# Patient Record
(demographics unavailable — no encounter records)

---

## 2017-11-01 NOTE — XMS REPORT
[facility]

 Created on: 2016



Cathleen Faith

External Reference #: [id number]

: 1994

Sex: Female



Demographics







 Address  [address] [place]  [address]

 

 Home Phone  [phone]

 

 Preferred Language  Unknown

 

 Marital Status  Unknown

 

 Protestant Affiliation  Unknown

 

 Race  White

 

 Ethnic Group  Not  or 





Author







 JAYCOB Avila

 

 [facility]  eClinical[employer]

 

 Address  Unknown

 

 Phone  Unavailable







Care Team Providers







 Care Team Member Name  Role  Phone

 

 JAYCOB GARCES  CP  Unavailable



                                                                



Allergies, Adverse Reactions, Alerts

          





 Substance  Reaction  Event Type

 

 Penicillin G Potassium  Info Not Available  Drug Allergy



                                                                               
         



Problems

          





 Problem Type  Condition  Code  Onset Dates  Condition Status

 

 Assessment  Dental caries  K02.9     Active



                                                                               
         



Medications

          No Known Medications                                                 
                                       



Procedures

          





 Procedure  Coding System  Code  Date

 

 EXTRAC ERUPTED TOOTH/EXPOSED ROOT  CPT-4    2016



                                                                               
                   



Vital Signs

          





 Date/Time:  2016

 

 Blood Pressure Diastolic  82 mmHg

 

 Blood Pressure Systolic  130 mmHg



                                                                              



Results

          No Known Results                                                     
               



Summary Purpose

          eClinicalWorks Submission

## 2017-11-01 NOTE — XMS REPORT
Continuity of Care Document

 Created on: 2017



JULEE PIMENTEL

External Reference #: [id number]

: 1994

Sex: Female



Demographics







 Address  [address] [place]  [address]

 

 Home Phone  [phone] x

 

 Preferred Language  Unknown

 

 Marital Status  Unknown

 

 Adventism Affiliation  Unknown

 

 Race  Unknown

 

 Ethnic Group  Unknown





Author







 Author  [facility]

 

 Organization  [facility]

 

 Address  Unknown

 

 Phone  Unavailable



                                                      



Allergies

                      





 Active                    Description                    Code                  
  Type                    Severity                    Reaction                  
  Onset                    Reported/Identified                    Relationship 
to Patient                    Clinical Status                

 

 Yes                    Penicillins                    [id number]              
      Drug Allergy                    Unknown                    RASH          
                               2016                                      
                    



                                                                               
         



Medications

                                                                               
         



Problems

                      





 Date Dx Coded                    Attending                    Type            
        Code                    Diagnosis                    Diagnosed By      
          

 

 03/15/2013                                         Ot                    599.0
                    URIN TRACT INFECTION NOS                                   
  

 

 03/15/2013                                         Ot                    
644.03                    THRT DAJUAN LABOR-ANTEPART                             
        

 

 03/15/2013                                         Ot                    
646.63                     INFECTION-ANTEPARTUM                              
       

 

 2013                                         Ot                    
646.13                    EDEMA IN PREG-ANTEPARTUM                             
        

 

 2013                                         Ot                    599.0
                    URIN TRACT INFECTION NOS                                   
  

 

 2013                                         Ot                    
644.03                    THRT DAJUAN LABOR-ANTEPART                             
        

 

 2013                                         Ot                    
646.63                     INFECTION-ANTEPARTUM                              
       

 

 2013                                         Ot                    
663.31                    CORD ENTANGLE NEC-DELIV                              
       

 

 2013                                         Ot                    V27.0
                    DELIVER-SINGLE LIVEBORN                                     

 

 2013                    KAYA RACHEL                    Ot     
               599.0                    URIN TRACT INFECTION NOS               
                      

 

 2013                    KAYA RACHEL                    Ot     
               780.2                    SYNCOPE AND COLLAPSE                   
                  

 

 2013                    KAYA RACHEL                    Ot     
               787.03                    VOMITING ALONE                        
             

 

 2013                    MEE WEBER DO                    Ot           
         300.00                    ANXIETY STATE NOS                           
          

 

 2013                    CHRISTIANO WEBER DOA DANIEL                    Ot           
         311                    DEPRESSIVE DISORDER NEC                        
             

 

 2013                    MEE WEBER DO                    Ot           
         599.0                    URIN TRACT INFECTION NOS                     
                

 

 2013                    MEE WEBER DO                    Ot           
         714.0                    RHEUMATOID ARTHRITIS                         
            

 

 2013                    MEE WEBER DO                    Ot           
         789.00                    ABDOMINAL PAIN, UNSPECIFIED SITE            
                         

 

 06/15/2014                    KAYA RACHEL                    Ot     
               729.5                    PAIN IN LIMB                           
          

 

 06/15/2014                    KAYA RACHEL                    Ot     
               845.00                    SPRAIN OF ANKLE NOS                   
                  

 

 06/15/2014                    KAYA RACHEL                    Ot     
               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 
MOVEM                                     

 

 2014                    TYE BHAGAT MD                    Ot       
             599.0                    URIN TRACT INFECTION NOS                 
                    

 

 2014                                         Ot                    
649.63                                                          

 

 2014                                         Ot                    
640.93                                                          

 

 2014                                         Ot                    
649.63                                                          

 

 2014                                         Ot                    
V28.89                                                          

 

 2014                    CARMEN MCGOVERN MD                    Ot        
            629.89                                                          

 

 2014                    CARMEN MCGOVERN MD                    Ot        
            789.01                                                          

 

 2014                    SRIDEVI HERNANDEZ, MELISSA LADD                    Ot    
                599.0                    URIN TRACT INFECTION NOS              
                       

 

 2014                    SRIDEVI HERNANDEZ, MELISSA LADD                    Ot    
                787.01                    NAUSEA WITH VOMITING                 
                    

 

 2014                    SRIDEVI HERNANDEZ, MELISSA LADD                    Ot    
                787.03                    VOMITING ALONE                       
              

 

 2014                    SRIDEVI HERNANDEZ, MELISSA LADD                    Ot    
                787.91                    DIARRHEA                             
        

 

 2015                                         Ot                    
649.63                                                          

 

 2015                                         Ot                    
640.93                                                          

 

 2015                                         Ot                    
649.63                                                          

 

 2015                                         Ot                    
V28.89                                                          

 

 2015                    CARMEN MCGOVERN MD                    Ot        
            629.89                                                          

 

 2015                    CARMEN MCGOVERN MD                    Ot        
            789.01                                                          

 

 2015                    MEE WEBER DO                    Ot           
         911.4                    INSECT BITE TRUNK                            
         

 

 2015                    MEE WEBER DO                    Ot           
         E000.8                    OTHER EXTERNAL CAUSE STATUS                 
                    

 

 2015                    MEE WEBER DO                    Ot           
         E906.4                    NONVENOM ARTHROPOD BITE                     
                

 

 2015                                         Ot                    
649.63                                                          

 

 2015                                         Ot                    
640.93                                                          

 

 2015                                         Ot                    
649.63                                                          

 

 2015                                         Ot                    
V28.89                                                          

 

 2015                    CARMEN MCGOVERN MD                    Ot        
            629.89                                                          

 

 2015                    CARMEN MCGOVERN MD                    Ot        
            789.01                                                          

 

 2015                                         Ot                    
649.63                                                          

 

 2015                                         Ot                    
640.93                                                          

 

 2015                                         Ot                    
649.63                                                          

 

 2015                                         Ot                    
V28.89                                                          

 

 2015                    CARMEN MCGOVERN MD                    Ot        
            629.89                                                          

 

 2015                    CARMEN MCGOVERN MD                    Ot        
            789.01                                                          

 

 2015                    TYE BHAGAT MD                    Ot       
             786.50                    CHEST PAIN NOS                          
           

 

 2015                    LEOLA HERNANDEZ, TYE SANCHEZ                    Ot       
             786.52                    PAINFUL RESPIRATION                     
                

 

 2015                    MEE WEBER DO                    Ot           
         381.10                    CHR SEROUS OM SIMP/NOS                      
               

 

 2015                    MEE WEBER DO                    Ot           
         464.00                    ACUTE LARYNGITIS W/O OBSTRUCTION            
                         

 

 2015                    MEE WEBER DO                    Ot           
         466.0                    ACUTE BRONCHITIS                             
        

 

 2015                    MEE WEBER DO                    Ot           
         473.9                    CHRONIC SINUSITIS NOS                        
             

 

 2015                    MEE WEBER DO                    Ot           
         786.2                    COUGH                                     

 

 2015                                         Ot                    
649.63                                                          

 

 2015                                         Ot                    
640.93                                                          

 

 2015                                         Ot                    
649.63                                                          

 

 2015                                         Ot                    
V28.89                                                          

 

 2015                    CARMEN MCGOVERN MD                    Ot        
            629.89                                                          

 

 2015                    CARMEN MCGOVERN MD                    Ot        
            789.01                                                          

 

 2016                                         Ot                    
649.63                                                          

 

 2016                                         Ot                    
640.93                                                          

 

 2016                                         Ot                    
649.63                                                          

 

 2016                                         Ot                    
V28.89                                                          

 

 2016                    CARMEN MCGOVERN MD                    Ot        
            629.89                                                          

 

 2016                    CARMEN MCGOVERN MD                    Ot        
            789.01                                                          

 

 2016                    CARMEN MCGOVERN MD                    Ot        
            B96.20                    UNSP ESCHERICHIA COLI AS THE CAUSE OF DI 
                                    

 

 2016                    CARMEN MCGOVERN MD                    Ot        
            I88.0                    NONSPECIFIC MESENTERIC LYMPHADENITIS      
                               

 

 2016                    CARMEN MCGOVERN MD                    Ot        
            K85.9                    ACUTE PANCREATITIS, UNSPECIFIED           
                          

 

 2016                    CARMEN MCGOVERN MD                    Ot        
            N26.1                    ATROPHY OF KIDNEY (TERMINAL)              
                       

 

 2016                    CARMEN MCGOVERN MD                    Ot        
            N39.0                    URINARY TRACT INFECTION, SITE NOT SPECIF  
                                   

 

 2016                    SALLY RAE                    Ot       
             S60.221A                    CONTUSION OF RIGHT HAND, INITIAL 
ENCOUNT                                     

 

 2016                    SALLY RAE                    Ot       
             W22.09XA                    STRIKING AGAINST OTHER STATIONARY 
OBJECT                                     

 

 2016                    SALLY RAE                    Ot       
             Y99.8                    OTHER EXTERNAL CAUSE STATUS              
                       

 

 2016                    CARMEN MCGOVERN MD                    Ot        
            K85.9                    ACUTE PANCREATITIS, UNSPECIFIED           
                          

 

 2016                    CARMEN MCGOVERN MD, Ot        
            K85.9                    ACUTE PANCREATITIS, UNSPECIFIED           
                          

 

 2016                    YAZ ALMARAZ, KAYA L                    Ot     
               R10.12                    LEFT UPPER QUADRANT PAIN              
                       

 

 2016                    KAYA RACHEL                    Ot     
               R11.0                    NAUSEA                                 
    

 

 2016                    KAYA RACHEL                    Ot     
               R10.12                    LEFT UPPER QUADRANT PAIN              
                       

 

 2016                    YAZ ALMARAZ KAYA L                    Ot     
               R11.0                    NAUSEA                                 
    

 

 2016                    SALLY RAE                    Ot       
             R10.13                    EPIGASTRIC PAIN                         
            

 

 2016                    SALLY RAE                    Ot       
             Z87.19                    PERSONAL HISTORY OF OTHER DISEASES OF TH
                                     

 

 2016                                         Ot                    
649.63                    UTERINE SIZE DATE DISCREPANCY, ANTEPARTU             
                        

 

 2016                                         Ot                    
640.93                    HEM EARLY PREG-ANTEPART                              
       

 

 2016                                         Ot                    
649.63                    UTERINE SIZE DATE DISCREPANCY, ANTEPARTU             
                        

 

 2016                                         Ot                    
V28.89                    OTHER SPECIFIED  SCREENING                  
                   

 

 2016                    CARMEN MCGOVERN MD                    Ot        
            629.89                    OTHER SPECIFIED DISORDERS OF FEMALE PEPE 
                                    

 

 2016                    CARMEN MCGOVERN MD                    Ot        
            789.01                    ABDOMINAL PAIN, RIGHT UPPER QUADRANT     
                                

 

 2016                    SALLY RAE                    Ot       
             R10.13                    EPIGASTRIC PAIN                         
            

 

 2016                    SALLY RAE                    Ot       
             Z87.19                    PERSONAL HISTORY OF OTHER DISEASES OF 
                                     

 

 2016                                         Ot                    
649.63                    UTERINE SIZE DATE DISCREPANCY, ANTEPARTU             
                        

 

 2016                                         Ot                    
640.93                    HEM EARLY PREG-ANTEPART                              
       

 

 2016                                         Ot                    
649.63                    UTERINE SIZE DATE DISCREPANCY, ANTEPARTU             
                        

 

 2016                                         Ot                    
V28.89                    OTHER SPECIFIED  SCREENING                  
                   

 

 2016                    FROILAN HERNANDEZ, CARMEN YU                    Ot        
            629.89                    OTHER SPECIFIED DISORDERS OF FEMALE PEPE 
                                    

 

 2016                    CARMEN MCGOVERN MD                    Ot        
            789.01                    ABDOMINAL PAIN, RIGHT UPPER QUADRANT     
                                

 

 2016                    SALLY RAE                    Ot       
             R10.13                    EPIGASTRIC PAIN                         
            

 

 2016                    SALLY RAE                    Ot       
             Z87.19                    PERSONAL HISTORY OF OTHER DISEASES OF 
                                     

 

 2016                                         Ot                    
649.63                    UTERINE SIZE DATE DISCREPANCY, ANTEPARTU             
                        

 

 2016                                         Ot                    
640.93                    HEM EARLY PREG-ANTEPART                              
       

 

 2016                                         Ot                    
649.63                    UTERINE SIZE DATE DISCREPANCY, ANTEPARTU             
                        

 

 2016                                         Ot                    
V28.89                    OTHER SPECIFIED  SCREENING                  
                   

 

 2016                    CARMEN MCGOVERN MD                    Ot        
            629.89                    OTHER SPECIFIED DISORDERS OF FEMALE PEPE 
                                    

 

 2016                    CARMEN MCGOVERN MD                    Ot        
            789.01                    ABDOMINAL PAIN, RIGHT UPPER QUADRANT     
                                

 

 2016                    SALLY RAE APRN                    Ot       
             R10.13                    EPIGASTRIC PAIN                         
            

 

 2016                    SALLY RAE APRN                    Ot       
             Z87.19                    PERSONAL HISTORY OF OTHER DISEASES OF TH
                                     

 

 2016                    CARMEN MCGOVERN MD                    Ot        
            K85.9                    ACUTE PANCREATITIS, UNSPECIFIED           
                          

 

 2016                    CARMEN MCGOVERN MD                    Ot        
            K85.9                    ACUTE PANCREATITIS, UNSPECIFIED           
                          

 

 2016                                         Ot                    
649.63                    UTERINE SIZE DATE DISCREPANCY, ANTEPARTU             
                        

 

 2016                                         Ot                    
640.93                    HEM EARLY PREG-ANTEPART                              
       

 

 2016                                         Ot                    
649.63                    UTERINE SIZE DATE DISCREPANCY, ANTEPARTU             
                        

 

 2016                                         Ot                    
V28.89                    OTHER SPECIFIED  SCREENING                  
                   

 

 2016                    CARMEN MCGOVERN MD                    Ot        
            629.89                    OTHER SPECIFIED DISORDERS OF FEMALE PEPE 
                                    

 

 2016                    CARMEN MCGOVERN MD                    Ot        
            789.01                    ABDOMINAL PAIN, RIGHT UPPER QUADRANT     
                                

 

 2016                    CARMEN MCGOVERN MD                    Ot        
            K85.9                    ACUTE PANCREATITIS, UNSPECIFIED           
                          

 

 2016                    SALLY RAE APRN                    Ot       
             R10.13                    EPIGASTRIC PAIN                         
            

 

 2016                    SALLY RAE                    Ot       
             Z87.19                    PERSONAL HISTORY OF OTHER DISEASES OF TH
                                     

 

 2016                    CARMEN MCGOVERN MD                    Ot        
            K81.1                    CHRONIC CHOLECYSTITIS                     
                

 

 2016                    CARMEN MCGOVERN MD                    Ot        
            K82.8                    OTHER SPECIFIED DISEASES OF GALLBLADDER   
                                  

 

 2016                    CARMEN MCGOVERN MD                    Ot        
            K85.9                    ACUTE PANCREATITIS, UNSPECIFIED           
                          

 

 2016                    CARMEN MCGOVERN MD                    Ot        
            K81.1                    CHRONIC CHOLECYSTITIS                     
                

 

 2016                    CRAMEN MCGOVERN MD                    Ot        
            K82.8                    OTHER SPECIFIED DISEASES OF GALLBLADDER   
                                  

 

 2016                                         Ot                    
649.63                    UTERINE SIZE DATE DISCREPANCY, ANTEPARTU             
                        

 

 2016                                         Ot                    
640.93                    HEM EARLY PREG-ANTEPART                              
       

 

 2016                                         Ot                    
649.63                    UTERINE SIZE DATE DISCREPANCY, ANTEPARTU             
                        

 

 2016                                         Ot                    
V28.89                    OTHER SPECIFIED  SCREENING                  
                   

 

 2016                    CARMEN MCGOVERN MD                    Ot        
            629.89                    OTHER SPECIFIED DISORDERS OF FEMALE PEPE 
                                    

 

 2016                    CARMEN MCGOVERN MD                    Ot        
            789.01                    ABDOMINAL PAIN, RIGHT UPPER QUADRANT     
                                

 

 2016                    CARMEN MCGOVERN MD                    Ot        
            K85.9                    ACUTE PANCREATITIS, UNSPECIFIED           
                          

 

 2016                    CARMEN MCGOVERN MD                    [place]        
            K85.9                    ACUTE PANCREATITIS, UNSPECIFIED           
                          

 

 2016                    SALLY RAE APRN                    Ot       
             R10.13                    EPIGASTRIC PAIN                         
            

 

 2016                    SALLY RAE                    Ot       
             Z87.19                    PERSONAL HISTORY OF OTHER DISEASES OF TH
                                     

 

 2016                    CARMEN MCGOVERN MD                    Ot        
            K81.1                    CHRONIC CHOLECYSTITIS                     
                

 

 2016                    CARMEN MCGOVERN MD                    Ot        
            K82.8                    OTHER SPECIFIED DISEASES OF GALLBLADDER   
                                  

 

 2016                    TYE BHAGAT MD                    Ot       
             H60.501                    UNSPECIFIED ACUTE NONINFECTIVE OTITIS 
EX                                     

 

 2016                    TYE BHAGAT MD                    Ot       
             J06.9                    ACUTE UPPER RESPIRATORY INFECTION, UNSPE 
                                    

 

 2016                    TYE BHAGAT MD                    Ot       
             R05                    COUGH                                     

 

 2016                    TYE BHAGAT MD                    Ot       
             H60.501                    UNSPECIFIED ACUTE NONINFECTIVE OTITIS 
EX                                     

 

 2016                    TYE BHAGAT MD                    Ot       
             J06.9                    ACUTE UPPER RESPIRATORY INFECTION, UNSPE 
                                    

 

 2016                    TYE BHAGAT MD                    Ot       
             R05                    COUGH                                     



                                                                               
                                                                               
                                                                               
                                                                               
                                                                               
                                                                               
                                                                               
                                                                               
                                                                               
                                                                               
                                                                               
                                                                               
                                                                               
                                                                               
                                                                               
                                                                               
                                                                               
                                                                               
                                                                               
                           



Procedures

                      





 Code                    Description                    Performed By           
         Performed On                

 

                     73.6                                                      
                                                               2013      
          



                                                                               
         



Results

                                                                    



Encounters

                      





 ACCT No.                    Visit Date/Time                    Discharge      
              Status                    Pt. Type                    Provider   
                 Facility                    Loc./Unit                    
Complaint                

 

 [id number]                    2016 08:28:00                    2016 09:00:00                    DIS                    Outpatient             
       TYE BHAGAT MD                    [facility] 
                   ER                    R EAR PAIN, SINUS INFECTION           
     

 

 [id number]                    2016 00:30:00                    2016 15:35:00                    DIS                    Outpatient             
       CARMEN MCGOVERN MD                    [facility]                                     

 

 [id number]                    2016 11:18:00                    2016 23:59:59                    CLS                    Outpatient             
       CARMEN MCGOVERN MD                    [facility]  
                  CARD                                     

 

 [id number]                    2016 19:06:00                    2016 20:36:00                    DIS                    Outpatient             
       SALLY RAE APRN                    [facility] 
                   ER                                     

 

 [id number]                    2016 19:00:00                    2016 23:00:00                    DIS                    Emergency              
      YAZ ALMARAZKAYA                    [facility]                                     

 

 [id number]                    2016 09:51:00                    2016 12:15:00                    DIS                    Inpatient              
      CARMEN MCGOVERN MD                    [facility] [address]                                     

 

 [id number]                    2016 09:55:00                    2016 11:22:00                    DIS                    Emergency              
      SALLY RAE                    [facility]                                     

 

 [id number]                    2016 14:26:00                    2016 09:00:00                    DIS                    Inpatient              
      CARMEN MCGOVERN MD                    [facility] [address]                                     

 

 [id number]                    2015 20:59:00                    2015 22:27:00                    DIS                    Emergency              
      TIA DO MEE K                    [facility]                                     

 

 [id number]                    2015 09:22:00                    2015 11:41:00                    DIS                    Emergency              
      TYE BHAGAT MD                    [facility]  
                  ER                                     

 

 [id number]                    2015 09:19:00                    2015 09:34:00                    DIS                    Emergency              
      TIA CHRISTIANO BURRA K                    [facility]                                     

 

 [id number]                    2014 08:46:00                    2014 10:17:00                    DIS                    Emergency              
      MELISSA LAUREANO MD                    [facility]                    ER                                     

 

 [id number]                    2014 16:41:00                    2014 18:52:00                    DIS                    Emergency              
      TYE BHAGAT MD                    [facility]  
                  ER                                     

 

 [id number]                    2014 10:39:00                    2014 23:59:59                    CLS                    Outpatient             
       CARMEN MCGOVERN MD                    [facility]                                     

 

 [id number]                    2014 13:43:00                    2014 23:59:59                    CLS                    Outpatient             
       CARMEN MCGOVERN MD                    [facility]                                     

 

 [id number]                    06/15/2014 13:15:00                    06/15/
2014 15:10:00                    DIS                    Emergency              
      KAYA RACHEL                    [facility]
                    ER                                     

 

 [id number]                    2013 12:27:00                    2013 14:09:00                    DIS                    Emergency              
      TIA BURR MEE DANIEL                    [facility]      
              ER                                     

 

 [id number]                    2013 20:40:00                    2013 23:15:00                    DIS                    Emergency              
      KAYA RACHEL                    [facility]
                    ER                                     

 

 [id number]                    2013 02:48:00                           
                                   Document Registration                       
                                                                             

 

 [id number]                    2013 22:05:00                           
                                   Document Registration                       
                                                                             

 

 [id number]                    2013 00:36:00                           
                                   Document Registration                       
                                                                             

 

 [id number]                    2013 16:00:00                           
                                   Document Registration                       
                                                                             

 

 [id number]                    2013 09:37:00                           
                                   Document Registration                       
                                                                             

 

 [id number]                    2012 10:39:00                           
                                   Document Registration                       
                                                                             

 

 [id number]                    2012 09:40:00                           
                                   Document Registration                       
                                                                             

 

 [id number]                    10/10/2012 09:16:00                           
                                   Document Registration

## 2017-11-01 NOTE — ED GENERAL
General


Chief Complaint:  Abdominal/GI Problems


Stated Complaint:  N,V,D


Nursing Triage Note:  


AMB TO ROOM REPORTS NOT FEELING WELL FOR 2 DAYS. ONSET OF VOMITING WITH   


DIARRHEA.


Nursing Sepsis Screen:  No Definite Risk


Source of Information:  Patient


Exam Limitations:  No Limitations





History of Present Illness


Time Seen by Provider:  08:26


Initial Comments


This 23-year-old woman presents to the emergency room with complaints of 

generally feeling ill for about 2 days.  She began vomiting and having diarrhea 

last night.  The vomiting has stopped but she is still nauseated.  She denies 

pregnancy.  She has mild generalized abdominal discomfort with cramping.





Allergies and Home Medications


Allergies


Coded Allergies:  


     Penicillins (Verified  Allergy, Unknown, RASH, 3/5/16)





Home Medications


Ondansetron 4 Mg Tab.rapdis, 4 MG SL Q4H PRN for NAUSEA/VOMITING-1ST LINE, #10


   Prescribed by: MILLA BUSBY on 11/1/17 0949





Constitutional:  see HPI, No fever


EENTM:  no symptoms reported


Respiratory:  no symptoms reported


Cardiovascular:  no symptoms reported


Gastrointestinal:  see HPI


Pregnant:  No


Musculoskeletal:  no symptoms reported


Skin:  no symptoms reported


Psychiatric/Neurological:  No Symptoms Reported


Hematologic/Lymphatic:  No Symptoms Reported





Past Medical-Social-Family Hx


Patient Social History


Alcohol Use:  Denies Use


Recreational Drug Use:  No


Smoking Status:  Former Smoker


Recent Foreign Travel:  No


Contact w/Someone Who Travel:  No


Recent Infectious Disease Expo:  No


Recent Hopitalizations:  No





Immunizations Up To Date


Tetanus Booster (TDap):  Less than 5yrs


Date of Influenza Vaccine:  Jan 14, 2013





Seasonal Allergies


Seasonal Allergies:  No





Surgeries


History of Surgeries:  Yes


Surgeries:  Gallbladder





Respiratory


History of Respiratory Disorde:  No


Currently Using CPAP:  No


Currently Using BIPAP:  No





Cardiovascular


History of Cardiac Disorders:  No





Neurological


History of Neurological Disord:  No





Reproductive System


Pregnant:  No


Hx Reproductive Disorders:  No


Sexually Transmitted Disease:  No


HIV/AIDS:  No


Female Reproductive Disorders:  Denies





Genitourinary


History of Genitourinary Disor:  Yes


Genitourinary Disorders:  UTI-Chronic





Gastrointestinal


History of Gastrointestinal Di:  Yes


Gastrointestinal Disorders:  Pancreatitis, Gall Bladder Disease





Musculoskeletal


History of Musculoskeletal Dis:  Yes


Musculoskeletal Disorders:  Rheumatoid Arthritis, Fractures





Endocrine


History of Endocrine Disorders:  No





HEENT


Loss of Vision:  Denies


Hearing Impairment:  Denies





Cancer


History of Cancer:  No





Psychosocial


History of Psychiatric Problem:  Yes


Behavioral Health Disorders:  Anxiety, Depression





Integumentary


History of Skin or Integumenta:  No


Skin/Integumentary Disorders:  Eczema





Blood Transfusions


History of Blood Disorders:  No


Adverse Reaction to a Blood Tr:  No





Family Medical History


Significant Family History:  No Pertinent Family Hx


Family Medial History:  


DENIES FAMILY HX





Physical Exam


Vital Signs





Vital Sign - Last 12Hours








 11/1/17 11/1/17





 08:17 09:55


 


Temp 98.5 


 


Pulse  80


 


Resp  18


 


Pulse Ox  98





Capillary Refill : Less Than 3 Seconds


General Appearance:  No Apparent Distress, WD/WN


HEENT:  Normal ENT Inspection, Pharynx Normal


Neck:  Normal Inspection


Respiratory:  Lungs Clear, Normal Breath Sounds, No Accessory Muscle Use, No 

Respiratory Distress


Cardiovascular:  Regular Rate, Rhythm, No Edema, No Murmur


Gastrointestinal:  Normal Bowel Sounds, Soft, Tenderness (Minimal, generalized)


Extremity:  Normal Inspection, No Pedal Edema


Neurologic/Psychiatric:  Alert, Oriented x3, No Motor/Sensory Deficits, Normal 

Mood/Affect


Skin:  Normal Color, Warm/Dry





Progress/Results/Core Measures


Results/Orders


My Orders





Medications Given in ED





Vital Signs/I&O





Progress Note :  


Progress Note


Nausea resolved with Zofran and abdominal pain resolved with GI cocktail.





Departure


Impression


Impression:  


 Primary Impression:  


 Nausea vomiting and diarrhea


 Additional Impressions:  


 Upper abdominal pain


 Gastritis


 Qualified Codes:  K29.00 - Acute gastritis without bleeding


Disposition:  01 HOME, SELF-CARE


Condition:  Improved





Departure-Patient Inst.


Decision time for Depature:  09:35


Referrals:  


CARMEN MCGOVERN MD (PCP/Family)


Primary Care Physician


Patient Instructions:  Acute Abdomen (Belly Pain), Adult (DC)





Add. Discharge Instructions:  


Drink plenty of clear liquids.  Take an antacid such as Pepcid (famotidine) or 

Prilosec (omeprazole) purchased over-the-counter for abdominal pain.  You may 

also take Tylenol (acetaminophen) up to 1000 mg every 6 hours as needed for 

additional pain relief.  Gradually advance your diet with small quantities of 

bland food as tolerated.  Take Zofran (ondansetron) dissolved under the tongue 

every 4 hours as needed for nausea and vomiting.  You may use Imodium over-the-

counter for diarrhea.  Return to care if symptoms worsen.











All discharge instructions reviewed with patient and/or family. Voiced 

understanding.


Scripts


Ondansetron (Zofran Odt) 4 Mg Tab.rapdis


4 MG SL Q4H Y for NAUSEA/VOMITING-1ST LINE, #10 TAB


   Prov: MILLA WILLIAM MD         11/1/17











MILLA WILLIAM MD Nov 1, 2017 09:38

## 2017-12-20 NOTE — ED INTEGUMENTARY GENERAL
General


Chief Complaint:  Skin/Wound Problems


Stated Complaint:  RASH


Nursing Triage Note:  


pt c/o red, itchy, raised areas on skin since saturday.  she reports they come 


and go.  reports benadryl improves the itchiness, but does not resolve the rash.


Source:  patient


Exam Limitations:  no limitations





History of Present Illness


Time seen by provider:  22:52


Initial Comments


To ER with red itchy bumps for the past 3-4 days.  They come and go.  Benadryl 

helps the itchiness but the bumps still pop up.  Mostly on the back of her arms 

and legs but she also has some on her torso.  No known cause.


Timing/Duration:  just prior to arrival


Severity:  moderate





Allergies and Home Medications


Allergies


Coded Allergies:  


     Penicillins (Verified  Allergy, Unknown, RASH, 3/5/16)





Home Medications


Diphenhydramine HCl 25 Mg Capsule, 50 MG PO PRN, (Reported)





Constitutional:  see HPI


EENTM:  see HPI


Respiratory:  no symptoms reported


Cardiovascular:  no symptoms reported


Genitourinary:  no symptoms reported


Musculoskeletal:  no symptoms reported


Skin:  see HPI


Psychiatric/Neurological:  No Symptoms Reported


Endocrine:  No Symptoms Reported





Past Medical-Social-Family Hx


Patient Social History


Alcohol Use:  Denies Use


Recreational Drug Use:  No


2nd Hand Smoke Exposure:  No


Recent Foreign Travel:  No


Contact w/Someone Who Travel:  No


Recent Infectious Disease Expo:  No


Recent Hopitalizations:  No





Immunizations Up To Date


Tetanus Booster (TDap):  Less than 5yrs


Date of Influenza Vaccine:  Jan 14, 2013





Seasonal Allergies


Seasonal Allergies:  No





Surgeries


History of Surgeries:  Yes


Surgeries:  Gallbladder





Respiratory


History of Respiratory Disorde:  No


Currently Using CPAP:  No


Currently Using BIPAP:  No





Cardiovascular


History of Cardiac Disorders:  No





Neurological


History of Neurological Disord:  No





Reproductive System


Last Menstrual Period:  Nov 30, 2017


Hx Reproductive Disorders:  No


Sexually Transmitted Disease:  No


HIV/AIDS:  No


Female Reproductive Disorders:  Denies





Genitourinary


History of Genitourinary Disor:  Yes


Genitourinary Disorders:  UTI-Chronic





Gastrointestinal


History of Gastrointestinal Di:  Yes


Gastrointestinal Disorders:  Pancreatitis, Gall Bladder Disease





Musculoskeletal


History of Musculoskeletal Dis:  Yes


Musculoskeletal Disorders:  Rheumatoid Arthritis, Fractures





Endocrine


History of Endocrine Disorders:  No





HEENT


Loss of Vision:  Denies


Hearing Impairment:  Denies





Cancer


History of Cancer:  No





Psychosocial


History of Psychiatric Problem:  Yes


Behavioral Health Disorders:  Anxiety, Depression





Integumentary


History of Skin or Integumenta:  No


Skin/Integumentary Disorders:  Eczema





Blood Transfusions


History of Blood Disorders:  No


Adverse Reaction to a Blood Tr:  No





Family Medical History


Significant Family History:  No Pertinent Family Hx


Family Medial History:  


DENIES FAMILY HX





Physical Exam


Vital Signs





Vital Sign - Last 12Hours








 12/20/17





 22:41


 


Temp 98.8


 


Pulse 100


 


Resp 16


 


B/P (MAP) 126/86 (99)





Capillary Refill : Less Than 3 Seconds


General Appearance:  WD/WN, no apparent distress


HEENT:  PERRL/EOMI, normal ENT inspection


Neck:  non-tender, full range of motion


Respiratory:  no respiratory distress, no accessory muscle use


Neurologic/Psychiatric:  alert, normal mood/affect, oriented x 3


Skin:  normal color, warm/dry (erythematous papules on erythematous base on the 

dorsal aspect of her arms a linear pattern consistent with bedbug bites.  I 

suggested that this might be bedbugs but she states this is not likely.)





Progress/Results/Core Measures


Results/Orders


Vital Signs/I&O





Vital Sign - Last 12Hours








 12/20/17





 22:41


 


Temp 98.8


 


Pulse 100


 


Resp 16


 


B/P (MAP) 126/86 (99)














Blood Pressure Mean:  99











Departure


Impression


Impression:  


 Primary Impression:  


 Rash and nonspecific skin eruption


Disposition:  01 HOME, SELF-CARE


Condition:  Stable





Departure-Patient Inst.


Decision time for Depature:  22:53


Referrals:  


CARMEN MCGOVERN MD (PCP/Family)


Primary Care Physician


Patient Instructions:  Skin Rash





Add. Discharge Instructions:  


1.  Return to ER for any worsening.  If rash persists follow up with your 

doctor next week.  Take the steroids as directed.


2.  All discharge instructions reviewed with patient and/or family. Voiced 

understanding.


Scripts


Prednisone (Prednisone) 20 Mg Tab


40 MG PO DAILY, #6 TAB


   Prov: SALLY RAE         12/20/17











SALLY RAE Dec 20, 2017 22:54

## 2017-12-21 NOTE — XMS REPORT
Continuity of Care Document

 Created on: 2017



OLGA PIMENTEL

External Reference #: [id number]

: 1994

Sex: Female



Demographics







 Address  [address] [place]  [address]

 

 Home Phone  [phone] x

 

 Preferred Language  Unknown

 

 Marital Status  Unknown

 

 Gnosticist Affiliation  Unknown

 

 Race  Unknown

 

 Ethnic Group  Unknown





Author







 Author  [facility]

 

 Organization  [facility]

 

 Address  Unknown

 

 Phone  Unavailable



              



Allergies

      





 Active            Description            Code            Type            
Severity            Reaction            Onset            Reported/Identified   
         Relationship to Patient            Clinical Status        

 

 Yes            Penicillins            [id number]            Drug Allergy      
      Unknown            RASH                         2016               
                   



                  



Medications

      



There is no data.                  



Problems

      





 Date Dx Coded            Attending            Type            Code            
Diagnosis            Diagnosed By        

 

 03/15/2013                         Ot            599.0            URIN TRACT 
INFECTION NOS                     

 

 03/15/2013                         Ot            644.03            THRT DAJUAN 
LABOR-ANTEPART                     

 

 03/15/2013                         Ot            646.63             INFECTION
-ANTEPARTUM                     

 

 2013                         Ot            646.13            EDEMA IN 
PREG-ANTEPARTUM                     

 

 2013                         Ot            599.0            URIN TRACT 
INFECTION NOS                     

 

 2013                         Ot            644.03            THRT DAJUAN 
LABOR-ANTEPART                     

 

 2013                         Ot            646.63             INFECTION
-ANTEPARTUM                     

 

 2013                         Ot            663.31            CORD 
ENTANGLE NEC-DELIV                     

 

 2013                         Ot            V27.0            DELIVER-
SINGLE LIVEBORN                     

 

 2013            KAYA RACHEL            Ot            599.0    
        URIN TRACT INFECTION NOS                     

 

 2013            KAYA RACHEL            Ot            780.2    
        SYNCOPE AND COLLAPSE                     

 

 2013            KAYA RACHEL            Ot            787.03   
         VOMITING ALONE                     

 

 2013            MEE WEBER DO            Ot            300.00         
   ANXIETY STATE NOS                     

 

 2013            MEE WEBER DO            Ot            311            
DEPRESSIVE DISORDER NEC                     

 

 2013            MEE WEBER DO            Ot            599.0          
  URIN TRACT INFECTION NOS                     

 

 2013            MEE WEBER DO            Ot            714.0          
  RHEUMATOID ARTHRITIS                     

 

 2013            MEE WEBER DO            Ot            789.00         
   ABDOMINAL PAIN, UNSPECIFIED SITE                     

 

 06/15/2014            KAYA RACHEL            Ot            729.5    
        PAIN IN LIMB                     

 

 06/15/2014            KAYA RACHEL            Ot            845.00   
         SPRAIN OF ANKLE NOS                     

 

 06/15/2014            YAZ PA, KAYA L            Ot            E927.0   
         OVEREXERTION FROM SUDDEN STRENUOUS MOVEM                     

 

 2014            TYE BHAGAT MD            Ot            599.0      
      URIN TRACT INFECTION NOS                     

 

 2014                         Ot            649.63                       
           

 

 2014                         Ot            640.93                       
           

 

 2014                         Ot            649.63                       
           

 

 2014                         Ot            V28.89                       
           

 

 2014            CARMEN MCGOVERN MD            Ot            629.89      
                            

 

 2014            CARMEN MCGOVERN MD            Ot            789.01      
                            

 

 2014            SRIDEVI HERNANDEZ, MELISSA LADD            Ot            599.0   
         URIN TRACT INFECTION NOS                     

 

 2014            SRIDEVI HERNANDEZ, MELISSA LADD            Ot            787.01  
          NAUSEA WITH VOMITING                     

 

 2014            MELISSA LAUREANO MD            Ot            787.03  
          VOMITING ALONE                     

 

 2014            MELISSA LAUREANO MD            Ot            787.91  
          DIARRHEA                     

 

 2015                         Ot            649.63                       
           

 

 2015                         Ot            640.93                       
           

 

 2015                         Ot            649.63                       
           

 

 2015                         Ot            V28.89                       
           

 

 2015            CARMEN MCGOVERN MD            Ot            629.89      
                            

 

 2015            CARMEN MCGOVERN MD            Ot            789.01      
                            

 

 2015            MEE WEBRE DO            Ot            911.4          
  INSECT BITE TRUNK                     

 

 2015            MEE WEBER DO            Ot            E000.8         
   OTHER EXTERNAL CAUSE STATUS                     

 

 2015            MEE WEBER DO            Ot            E906.4         
   NONVENOM ARTHROPOD BITE                     

 

 2015                         Ot            649.63                       
           

 

 2015                         Ot            640.93                       
           

 

 2015                         Ot            649.63                       
           

 

 2015                         Ot            V28.89                       
           

 

 2015            CARMEN MCGOVERN MD            Ot            629.89      
                            

 

 2015            CARMEN MCGOVERN MD            Ot            789.01      
                            

 

 2015                         Ot            649.63                       
           

 

 2015                         Ot            640.93                       
           

 

 2015                         Ot            649.63                       
           

 

 2015                         Ot            V28.89                       
           

 

 2015            CARMEN MCGOVERN MD            Ot            629.89      
                            

 

 2015            CARMEN MCGOVERN MD            Ot            789.01      
                            

 

 2015            TYE BHAGAT MD            Ot            786.50     
       CHEST PAIN NOS                     

 

 2015            LEOLA HERNANDEZ, TYE SANCHEZ            Ot            786.52     
       PAINFUL RESPIRATION                     

 

 2015            MEE WEBER DO            Ot            381.10         
   CHR SEROUS OM SIMP/NOS                     

 

 2015            MEE WEBER DO            Ot            464.00         
   ACUTE LARYNGITIS W/O OBSTRUCTION                     

 

 2015            MEE WEBER DO            Ot            466.0          
  ACUTE BRONCHITIS                     

 

 2015            MEE WEBER DO            Ot            473.9          
  CHRONIC SINUSITIS NOS                     

 

 2015            MEE WEBER DO            Ot            786.2          
  COUGH                     

 

 2015                         Ot            649.63                       
           

 

 2015                         Ot            640.93                       
           

 

 2015                         Ot            649.63                       
           

 

 2015                         Ot            V28.89                       
           

 

 2015            CARMEN MCGOVERN MD            Ot            629.89      
                            

 

 2015            CARMEN MCGOVERN MD            Ot            789.01      
                            

 

 2016                         Ot            649.63                       
           

 

 2016                         Ot            640.93                       
           

 

 2016                         Ot            649.63                       
           

 

 2016                         Ot            V28.89                       
           

 

 2016            CARMEN MCGOVERN MD            Ot            629.89      
                            

 

 2016            CARMEN MCGOVERN MD            Ot            789.01      
                            

 

 2016            CARMEN MCGOVERN MD            Ot            B96.20      
      UNSP ESCHERICHIA COLI AS THE CAUSE OF DI                     

 

 2016            CARMEN MCGOVERN MD            Ot            I88.0       
     NONSPECIFIC MESENTERIC LYMPHADENITIS                     

 

 2016            CARMEN MCGOVERN MD, Ot            K85.9       
     ACUTE PANCREATITIS, UNSPECIFIED                     

 

 2016            CARMEN MCGOVERN MD            Ot            N26.1       
     ATROPHY OF KIDNEY (TERMINAL)                     

 

 2016            CARMEN MCGOVERN MD            Ot            N39.0       
     URINARY TRACT INFECTION, SITE NOT SPECIF                     

 

 2016            SALLY RAE            Ot            S60.221A   
         CONTUSION OF RIGHT HAND, INITIAL ENCOUNT                     

 

 2016            SALLY RAE            Ot            W22.09XA   
         STRIKING AGAINST OTHER STATIONARY OBJECT                     

 

 2016            SALLY RAE            Ot            Y99.8      
      OTHER EXTERNAL CAUSE STATUS                     

 

 2016            CARMEN MCGOVERN MD            Ot            K85.9       
     ACUTE PANCREATITIS, UNSPECIFIED                     

 

 2016            CARMEN MCGOVERN MD, Ot            K85.9       
     ACUTE PANCREATITIS, UNSPECIFIED                     

 

 2016            YAZ PA, KAYA GARCIA            Ot            R10.12   
         LEFT UPPER QUADRANT PAIN                     

 

 2016            YAZ ALMARAZ, KAYA GARCIA            Ot            R11.0    
        NAUSEA                     

 

 2016            YAZ ALMARAZ, KAYA RADHA            Ot            R10.12   
         LEFT UPPER QUADRANT PAIN                     

 

 2016            YAZ ALMARAZKAYA            Ot            R11.0    
        NAUSEA                     

 

 2016            SALLY RAE            Ot            R10.13     
       EPIGASTRIC PAIN                     

 

 2016            SALLY RAE            Ot            Z87.19     
       PERSONAL HISTORY OF OTHER DISEASES OF TH                     

 

 2016                         Ot            649.63            UTERINE 
SIZE DATE DISCREPANCY, ANTEPARTU                     

 

 2016                         Ot            640.93            HEM EARLY 
PREG-ANTEPART                     

 

 2016                         Ot            649.63            UTERINE 
SIZE DATE DISCREPANCY, ANTEPARTU                     

 

 2016                         Ot            V28.89            OTHER 
SPECIFIED  SCREENING                     

 

 2016            CARMEN MCGOVERN MD            Ot            629.89      
      OTHER SPECIFIED DISORDERS OF FEMALE PEPE                     

 

 2016            CARMEN MCGOVERN MD            Ot            789.01      
      ABDOMINAL PAIN, RIGHT UPPER QUADRANT                     

 

 2016            SALLY RAE            Ot            R10.13     
       EPIGASTRIC PAIN                     

 

 2016            SALLY RAE            Ot            Z87.19     
       PERSONAL HISTORY OF OTHER DISEASES OF                      

 

 2016                         Ot            649.63            UTERINE 
SIZE DATE DISCREPANCY, ANTEPARTU                     

 

 2016                         Ot            640.93            HEM EARLY 
PREG-ANTEPART                     

 

 2016                         Ot            649.63            UTERINE 
SIZE DATE DISCREPANCY, ANTEPARTU                     

 

 2016                         Ot            V28.89            OTHER 
SPECIFIED  SCREENING                     

 

 2016            CARMEN MCGOVERN MD            Ot            629.89      
      OTHER SPECIFIED DISORDERS OF FEMALE PEPE                     

 

 2016            CARMEN MCGOVERN MD            Ot            789.01      
      ABDOMINAL PAIN, RIGHT UPPER QUADRANT                     

 

 2016            SALLY RAE            Ot            R10.13     
       EPIGASTRIC PAIN                     

 

 2016            SALLY RAE            Ot            Z87.19     
       PERSONAL HISTORY OF OTHER DISEASES OF                      

 

 2016                         Ot            649.63            UTERINE 
SIZE DATE DISCREPANCY, ANTEPARTU                     

 

 2016                         Ot            640.93            HEM EARLY 
PREG-ANTEPART                     

 

 2016                         Ot            649.63            UTERINE 
SIZE DATE DISCREPANCY, ANTEPARTU                     

 

 2016                         Ot            V28.89            OTHER 
SPECIFIED  SCREENING                     

 

 2016            CARMEN MCGOVERN MD            Ot            629.89      
      OTHER SPECIFIED DISORDERS OF FEMALE PEPE                     

 

 2016            CARMEN MCGOVERN MD            Ot            789.01      
      ABDOMINAL PAIN, RIGHT UPPER QUADRANT                     

 

 2016            SALLY RAE APRN            Ot            R10.13     
       EPIGASTRIC PAIN                     

 

 2016            SALLY RAE APRN            Ot            Z87.19     
       PERSONAL HISTORY OF OTHER DISEASES OF TH                     

 

 2016            CARMEN MCGOVERN MD            Ot            K85.9       
     ACUTE PANCREATITIS, UNSPECIFIED                     

 

 2016            CARMEN MCGOVERN MD            Ot            K85.9       
     ACUTE PANCREATITIS, UNSPECIFIED                     

 

 2016                         Ot            649.63            UTERINE 
SIZE DATE DISCREPANCY, ANTEPARTU                     

 

 2016                         Ot            640.93            HEM EARLY 
PREG-ANTEPART                     

 

 2016                         Ot            649.63            UTERINE 
SIZE DATE DISCREPANCY, ANTEPARTU                     

 

 2016                         Ot            V28.89            OTHER 
SPECIFIED  SCREENING                     

 

 2016            CARMEN MCGOVERN MD            Ot            629.89      
      OTHER SPECIFIED DISORDERS OF FEMALE PEPE                     

 

 2016            CARMEN MCGOVERN MD            Ot            789.01      
      ABDOMINAL PAIN, RIGHT UPPER QUADRANT                     

 

 2016            CARMEN MCGOVERN MD            Ot            K85.9       
     ACUTE PANCREATITIS, UNSPECIFIED                     

 

 2016            SALLY RAE            Ot            R10.13     
       EPIGASTRIC PAIN                     

 

 2016            SALLY RAE APRN            Ot            Z87.19     
       PERSONAL HISTORY OF OTHER DISEASES OF TH                     

 

 2016            CARMEN MCGOVERN MD            Ot            K81.1       
     CHRONIC CHOLECYSTITIS                     

 

 2016            CARMEN MCGOVERN MD            Ot            K82.8       
     OTHER SPECIFIED DISEASES OF GALLBLADDER                     

 

 2016            CARMEN MCGOVERN MD            Ot            K85.9       
     ACUTE PANCREATITIS, UNSPECIFIED                     

 

 2016            CARMEN MCGOVERN MD            Ot            K81.1       
     CHRONIC CHOLECYSTITIS                     

 

 2016            CARMEN MCGOVERN MD            Ot            K82.8       
     OTHER SPECIFIED DISEASES OF GALLBLADDER                     

 

 2016                         Ot            649.63            UTERINE 
SIZE DATE DISCREPANCY, ANTEPARTU                     

 

 2016                         Ot            640.93            HEM EARLY 
PREG-ANTEPART                     

 

 2016                         Ot            649.63            UTERINE 
SIZE DATE DISCREPANCY, ANTEPARTU                     

 

 2016                         Ot            V28.89            OTHER 
SPECIFIED  SCREENING                     

 

 2016            CRAMEN MCGOVERN MD            Ot            629.89      
      OTHER SPECIFIED DISORDERS OF FEMALE PEPE                     

 

 2016            CARMEN MCGOVERN MD            Ot            789.01      
      ABDOMINAL PAIN, RIGHT UPPER QUADRANT                     

 

 2016            CARMEN MCGOVERN MD            Ot            K85.9       
     ACUTE PANCREATITIS, UNSPECIFIED                     

 

 2016            CARMEN MCGOVERN MD            Ot            K85.9       
     ACUTE PANCREATITIS, UNSPECIFIED                     

 

 2016            SALLY RAE            Ot            R10.13     
       EPIGASTRIC PAIN                     

 

 2016            SALLY RAE            Ot            Z87.19     
       PERSONAL HISTORY OF OTHER DISEASES OF TH                     

 

 2016            CARMEN MCGOVERN MD            Ot            K81.1       
     CHRONIC CHOLECYSTITIS                     

 

 2016            CARMEN MCGOVERN MD            Ot            K82.8       
     OTHER SPECIFIED DISEASES OF GALLBLADDER                     

 

 2016            TYE BHAGAT MD            Ot            H60.501    
        UNSPECIFIED ACUTE NONINFECTIVE OTITIS EX                     

 

 2016            TYE BHAGAT MD            Ot            J06.9      
      ACUTE UPPER RESPIRATORY INFECTION, UNSPE                     

 

 2016            TYE BHAGAT MD            Ot            R05        
    COUGH                     

 

 2016            TYE BHAGAT MD            Ot            H60.501    
        UNSPECIFIED ACUTE NONINFECTIVE OTITIS EX                     

 

 2016            TYE BHAGAT MD            Ot            J06.9      
      ACUTE UPPER RESPIRATORY INFECTION, UNSPE                     

 

 2016            TYE BHAGAT MD            Ot            R05        
    COUGH                     

 

 2017            MILLA WILLIAM MD            Ot            F32.9 
           MAJOR DEPRESSIVE DISORDER, SINGLE EPISOD                     

 

 2017            MILLA WILLIAM MD            Ot            F41.9 
           ANXIETY DISORDER, UNSPECIFIED                     

 

 2017            MILLA WILLIAM MD            Ot            K52.9 
           NONINFECTIVE GASTROENTERITIS AND COLITIS                     

 

 2017            MILLA WILLIAM MD            Ot            M06.9 
           RHEUMATOID ARTHRITIS, UNSPECIFIED                     

 

 2017            MILLA WILLIAM MD            Ot            R19.7 
           DIARRHEA, UNSPECIFIED                     

 

 2017            MILLA WILLIAM MD            Ot            Z87.19
            PERSONAL HISTORY OF OTHER DISEASES OF TH                     

 

 2017            MILLA WILLIAM MD            Ot            Z87.81
            PERSONAL HISTORY OF (HEALED) TRAUMATIC F                     

 

 2017            MILLA WILLIAM MD            Ot            
Z87.891            PERSONAL HISTORY OF NICOTINE DEPENDENCE                     

 

 2017            MILLA WILLIAM MD            Ot            F32.9 
           MAJOR DEPRESSIVE DISORDER, SINGLE EPISOD                     

 

 2017            MILLA WILLIAM MD            Ot            F41.9 
           ANXIETY DISORDER, UNSPECIFIED                     

 

 2017            MILLA WILLIAM MD            Ot            K52.9 
           NONINFECTIVE GASTROENTERITIS AND COLITIS                     

 

 2017            MILLA WILLIAM MD            Ot            M06.9 
           RHEUMATOID ARTHRITIS, UNSPECIFIED                     

 

 2017            MILLA WILLIAM MD            Ot            R19.7 
           DIARRHEA, UNSPECIFIED                     

 

 2017            MILLA WILLIAM MD            Ot            Z87.19
            PERSONAL HISTORY OF OTHER DISEASES OF TH                     

 

 2017            MILLA WILLIAM MD            Ot            Z87.81
            PERSONAL HISTORY OF (HEALED) TRAUMATIC F                     

 

 2017            MILLA WILLIAM MD            Ot            
Z87.891            PERSONAL HISTORY OF NICOTINE DEPENDENCE                     



                                                                               
                                                                               
                                                                               
                                                                               
                      



Procedures

      





 Code            Description            Performed By            Performed On   
     

 

             73.6                                                              
       2013        



                  



Results

      



There is no data.              



Encounters

      





 ACCT No.            Visit Date/Time            Discharge            Status    
        Pt. Type            Provider            Facility            Loc./Unit  
          Complaint        

 

 [id number]            2017 08:03:00            2017 09:52:00    
        DIS            Emergency            MILLA WILLIAM MD            
[facility]            ER            N,V,D        

 

 [id number]            2016 08:28:00            2016 09:00:00    
        DIS            Outpatient            TYE BHAGAT MD            [facility]            ER            R EAR PAIN, SINUS 
INFECTION        

 

 [id number]            2016 00:30:00            2016 15:35:00    
        DIS            Outpatient            CARMEN MCGOVERN MD            [facility] 
[facility]                     

 

 [id number]            2016 11:18:00            2016 23:59:59    
        CLS            Outpatient            CARMEN MCGOVERN MD            [facility] 
[facility]                     

 

 [id number]            2016 19:06:00            2016 20:36:00    
        DIS            Outpatient            SALLY RAE            [facility] 
[facility]                     

 

 [id number]            2016 19:00:00            2016 23:00:00    
        DIS            Emergency            KAYA RACHEL            
[facility]                     

 

 [id number]            2016 09:51:00            2016 12:15:00    
        DIS            Inpatient            CARMEN MCGOVERN MD            [facility] 
[address]                     

 

 [id number]            2016 09:55:00            2016 11:22:00    
        DIS            Emergency            SALLY RAE APRN            [facility] 
[facility]                     

 

 [id number]            2016 14:26:00            2016 09:00:00    
        DIS            Inpatient            CARMEN MCGOVERN MD            [facility] 
[address]                     

 

 [id number]            2015 20:59:00            2015 22:27:00    
        DIS            Emergency            MEE WEBER DO            [facility] 
[facility]                     

 

 [id number]            2015 09:22:00            2015 11:41:00    
        DIS            Emergency            TYE BHAGAT MD            [facility] 
[facility]                     

 

 [id number]            2015 09:19:00            2015 09:34:00    
        DIS            Emergency            MEE WEBER DO            [facility] 
[facility]                     

 

 [id number]            2014 08:46:00            2014 10:17:00    
        DIS            Emergency            MELISSA LAUREANO MD            
[facility]            ER                     

 

 [id number]            2014 16:41:00            2014 18:52:00    
        DIS            Emergency            TYE BHAGAT MD            [facility] 
[facility]            ER                     

 

 [id number]            2014 10:39:00            2014 23:59:59    
        CLS            Outpatient            CARMEN MCGOVERN MD            [facility] 
[facility]                     

 

 [id number]            2014 13:43:00            2014 23:59:59    
        CLS            Outpatient            CARMEN MCGOVERN MD            [facility] 
[facility]                     

 

 [id number]            06/15/2014 13:15:00            06/15/2014 15:10:00    
        DIS            Emergency            KAYA RACHEL            
[facility]            ER                     

 

 [id number]            2013 12:27:00            2013 14:09:00    
        DIS            Emergency            TIA BURR MEE SANCHEZ            [facility] 
[facility]            ER                     

 

 [id number]            2013 20:40:00            2013 23:15:00    
        DIS            Emergency            KAYA RACHEL            
[facility]            ER                     

 

 [id number]            2017 20:49:00                         [facility]       
     Emergency            BAYRON STONE MD            [facility]            ER            RASH        

 

 [id number]            2013 02:48:00                                   
   Document Registration                                                       
     

 

 [id number]            2013 22:05:00                                   
   Document Registration                                                       
     

 

 [id number]            2013 00:36:00                                   
   Document Registration                                                       
     

 

 [id number]            2013 16:00:00                                   
   Document Registration                                                       
     

 

 [id number]            2013 09:37:00                                   
   Document Registration                                                       
     

 

 [id number]            2012 10:39:00                                   
   Document Registration                                                       
     

 

 [id number]            2012 09:40:00                                   
   Document Registration                                                       
     

 

 [id number]            10/10/2012 09:16:00                                   
   Document Registration

## 2019-04-08 NOTE — ED LOWER EXTREMITY
General


Chief Complaint:  Lower Extremity


Stated Complaint:  R ANKLE INJ


Source:  patient


Exam Limitations:  no limitations





History of Present Illness


Date Seen by Provider:  Apr 8, 2019


Time Seen by Provider:  14:48


Initial Comments


The patient presents to ER by private conveyance with chief complaint that 

about an hour ago she was stepping off a small porch to get into her car and 

rolled her ankle inwardly. Should quite a bit of pain immediately and felt a 

popping sensation. She went into her house proper foot up with an ice pack on 

it and took some Tylenol but did not receive a lot of relief from that. She 

says she is having increasing swelling on the side of her right foot so she 

decided to have it x-rayed. No history of fracture, surgery of the foot or 

ankle.





Allergies and Home Medications


Allergies


Coded Allergies:  


     Penicillins (Verified  Allergy, Unknown, RASH, 3/5/16)





Home Medications


Diphenhydramine HCl 25 Mg Capsule, 50 MG PO PRN, (Reported)


Prednisone 20 Mg Tab, 40 MG PO DAILY


   Prescribed by: SALLY REA on 12/20/17 2167





Patient Home Medication List


Home Medication List Reviewed:  Yes





Review of Systems


Constitutional:  No chills, No fever


EENTM:  No ear discharge, No eye pain


Respiratory:  No cough, No phlegm


Cardiovascular:  No chest pain, No edema





Past Medical-Social-Family Hx


Patient Social History


Alcohol Use:  Denies Use


Recreational Drug Use:  No


Smoking Status:  Never a Smoker


2nd Hand Smoke Exposure:  No


Recent Foreign Travel:  No


Contact w/Someone Who Travel:  No


Recent Hopitalizations:  No





Immunizations Up To Date


Tetanus Booster (TDap):  Less than 5yrs


Date of Influenza Vaccine:  Jan 14, 2013





Seasonal Allergies


Seasonal Allergies:  No





Past Medical History


Surgeries:  Yes


Gallbladder


Respiratory:  No


Currently Using CPAP:  No


Currently Using BIPAP:  No


Cardiac:  No


Neurological:  No


Reproductive Disorders:  No


Female Reproductive Disorders:  Denies


Sexually Transmitted Disease:  No


HIV/AIDS:  No


Genitourinary:  Yes


UTI-Chronic


Gastrointestinal:  Yes


Pancreatitis, Gall Bladder Disease


Musculoskeletal:  Yes


Rheumatoid Arthritis, Fractures


Endocrine:  No


Loss of Vision:  Denies


Hearing Impairment:  Denies


Cancer:  No


Psychosocial:  Yes


Anxiety, Depression


Integumentary:  No


Eczema


Blood Disorders:  No


Adverse Reaction/Blood Tranf:  No





Family Medical History





DENIES FAMILY HX


No Pertinent Family Hx





Physical Exam


Vital Signs





Vital Signs - First Documented








 4/8/19





 14:49


 


Temp 98.0


 


Pulse 72


 


Resp 18


 


B/P (MAP) 124/88 (100)


 


Pulse Ox 99





Capillary Refill :


Height, Weight, BMI


Height: 5'3.00"


Weight: 143lbs. 9.6oz. 64.222965ul; 30.0 BMI


Method:Stated


General Appearance:  WD/WN, no apparent distress


HEENT:  PERRL/EOMI, pharynx normal


Neck:  full range of motion, normal inspection


Cardiovascular:  normal peripheral pulses, regular rate, rhythm


Respiratory:  no respiratory distress, no accessory muscle use


Ankles:  bilateral ankle non-tender, bilateral ankle normal inspection, 

bilateral ankle normal range of motion


Feet:  left foot non-tender, left foot normal inspection; bilateral foot normal 

range of motion; left foot no evidence of injury; right foot ecchymosis, right 

foot pain, right foot soft tissue tenderness, right foot swelling


Neurologic/Tendon:  normal sensation, normal motor functions, normal tendon 

functions


Neurologic/Psychiatric:  no motor/sensory deficits, alert, oriented x 3


Skin:  normal color, warm/dry





Progress/Results/Core Measures


Results/Orders


My Orders





Orders - BAYRON STONE


Ibuprofen  Tablet (Motrin  Tablet) (4/8/19 15:00)


Foot, Right, 3 View (4/8/19 14:53)





Medications Given in ED





Current Medications








 Medications  Dose


 Ordered  Sig/Te


 Route  Start Time


 Stop Time Status Last Admin


Dose Admin


 


 Ibuprofen  800 mg  ONCE  ONCE


 PO  4/8/19 15:00


 4/8/19 15:01 DC 4/8/19 15:28


800 MG








Vital Signs/I&O











 4/8/19





 14:49


 


Temp 98.0


 


Pulse 72


 


Resp 18


 


B/P (MAP) 124/88 (100)


 


Pulse Ox 99











Progress


Progress Note :  


   Time:  15:35


Progress Note


Patient has a large area of swelling over the top of her foot with some 

tenderness to the metatarsals on the right foot. Plan to get an x-ray. 

Ibuprofen for pain.





Diagnostic Imaging





   Diagonstic Imaging:  Xray


   Plain Films/CT/US/NM/MRI:  other (right foot)


Comments


No acute osseous abnormalities noted.


   Reviewed:  Reviewed by Me





Departure


Impression





 Primary Impression:  


 Sprain and strain of foot


 Additional Impression:  


 Traumatic hematoma of foot


 Qualified Codes:  S90.31XA - Contusion of right foot, initial encounter


Disposition:  01 HOME, SELF-CARE


Condition:  Stable





Departure-Patient Inst.


Decision time for Depature:  15:37


Referrals:  


CARMEN MCGOVERN MD (PCP/Family)


Primary Care Physician


Patient Instructions:  Foot Sprain (DC)





Add. Discharge Instructions:  


Elevate your foot when possible.


Wrap it with an Ace bandage applied gentle pressure over the hematoma.


Apply ice for 20 minutes every 4 hours for the first 3 days.


Tylenol 1000 mg every 8 hours in addition to ibuprofen 800 mg every 8 hours as 

necessary.


If you are still having significant pain 7-10 days out then you should follow-

up with your primary care doctor for reexamination and possible reimaging for 

occult fracture.


All discharge instructions reviewed with patient and/or family. Voiced 

understanding.


Work/School Note:  Work Release Form   Date Seen in the Emergency Department:  

Apr 8, 2019


   Return to Work:  Apr 9, 2019


   Restrictions:  Need Release from Doctor


   Other Restrictions Listed Below:  Keep foot elevated when possible; use an 

Ace bandage and ice.


   Restrictions:  Minimize use of your right foot until 4/14/19.











BAYRON STONE Apr 8, 2019 14:57

## 2019-04-08 NOTE — DIAGNOSTIC IMAGING REPORT
INDICATION: Twisting injury, lightheadedness and dizziness, pop

and pain, swelling laterally.



FINDINGS: Base of the fifth metatarsal is intact. No dislocation

or subluxation at the tarsometatarsal joints. No fracture is

identified. No articular incongruity. No focal swelling could be

radiographically discerned.



IMPRESSION: No acute-appearing abnormality.



Dictated by: 



  Dictated on workstation # [id number]

## 2021-09-10 NOTE — ED INTEGUMENTARY GENERAL
General


Chief Complaint:  Skin/Wound Problems


Stated Complaint:  R LEG WOUND


Nursing Triage Note:  


PT AMBULATE TO ROOM 07 WITH C/O WOUND ON GROIN AREA.


Source:  patient


Exam Limitations:  no limitations





History of Present Illness


Date Seen by Provider:  Sep 10, 2021


Time Seen by Provider:  17:13


Initial Comments


To ER with c/o abscess to right groin for 2 days. No fever or chills. Hx of RA 

and not currently on any treatment for that. no fever ochills.


Timing/Duration:  yesterday


Severity:  moderate


Associated Symptoms:  denies symptoms





Allergies and Home Medications


Allergies


Coded Allergies:  


     Penicillins (Verified  Allergy, Unknown, RASH, 3/5/16)





Patient Home Medication List


Home Medication List Reviewed:  Yes


Diphenhydramine HCl (Benadryl) 25 Mg Capsule, 50 MG PO PRN, (Reported)


   Entered as Reported by: SYDNIE DHALIWAL on 12/20/17 2245


Prednisone (Prednisone) 20 Mg Tab, 40 MG PO DAILY


   Prescribed by: SALLY RAE on 12/20/17 4524





Review of Systems


Review of Systems


Constitutional:  see HPI


EENTM:  see HPI


Respiratory:  no symptoms reported


Cardiovascular:  no symptoms reported


Genitourinary:  no symptoms reported


Musculoskeletal:  no symptoms reported


Skin:  see HPI


Psychiatric/Neurological:  No Symptoms Reported





Past Medical-Social-Family Hx


Patient Social History


Tobacco Use?:  No


Smoking Status:  Never a Smoker


Substance use?:  No


Alcohol Use?:  No





Immunizations Up To Date


Tetanus Booster (TDap):  Less than 5yrs





Seasonal Allergies


Seasonal Allergies:  No





Past Medical History


Surgeries:  Yes


Gallbladder


Respiratory:  No


Currently Using CPAP:  No


Currently Using BIPAP:  No


Cardiac:  No


Neurological:  No


Reproductive Disorders:  No


Female Reproductive Disorders:  Denies


Sexually Transmitted Disease:  No


HIV/AIDS:  No


Genitourinary:  Yes


UTI-Chronic


Gastrointestinal:  Yes


Pancreatitis, Gall Bladder Disease


Musculoskeletal:  Yes


Rheumatoid Arthritis, Fractures


Endocrine:  No


Loss of Vision:  Denies


Hearing Impairment:  Denies


Cancer:  No


Psychosocial:  Yes


Anxiety, Depression


Integumentary:  No


Eczema


Blood Disorders:  No


Adverse Reaction/Blood Tranf:  No





Family Medical History





DENIES FAMILY HX


No Pertinent Family Hx





Physical Exam


Vital Signs





Vital Signs - First Documented








 9/10/21





 17:07


 


Temp 36.9


 


Pulse 106


 


Resp 18


 


B/P (MAP) 140/95 (110)


 


O2 Delivery Room Air





Capillary Refill : Less Than 3 Seconds


General Appearance:  WD/WN, no apparent distress


Neck:  non-tender, full range of motion


Respiratory:  no respiratory distress, no accessory muscle use


Neurologic/Psychiatric:  alert, normal mood/affect, oriented x 3


Skin:  normal color, warm/dry


Skin Problem Character:  abscess, other (draining punctum to proximal medial 

right thigh with about 1cm mild induration and no fluctuance, 3cm surrounding 

erythema. Drainage is bloody purulent. )





Progress/Results/Core Measures


Results/Orders


My Orders





Orders - SALLY RAE


Wound Culture (9/10/21 17:10)





Vital Signs/I&O











 9/10/21





 17:07


 


Temp 36.9


 


Pulse 106


 


Resp 18


 


B/P (MAP) 140/95 (110)


 


O2 Delivery Room Air














Blood Pressure Mean:                    110











Departure


Communication (Admissions)


no treatment done here. Wound culture obtained.





Impression





   Primary Impression:  


   Abscess


Disposition:  01 HOME, SELF-CARE


Condition:  Stable





Departure-Patient Inst.


Decision time for Depature:  17:15


Referrals:  


CARMEN MCGOVERN MD (PCP/Family)


Primary Care Physician


Patient Instructions:  Skin Abscess





Add. Discharge Instructions:  


1. Warm moist compresses a few times a day. Antibiotics as directed, start 

today. Return to ER for any worsening. Expect continued drainage for the next f

ew days. 





All discharge instructions reviewed with patient and/or family. Voiced 

understanding.


Scripts


Doxycycline Hyclate (Doxycycline Hyclate) 100 Mg Tablet


100 MG PO BID, #14 TAB 0 Refills


   Prov: SALLY RAE         9/10/21











SALLY RAE             Sep 10, 2021 17:16